# Patient Record
Sex: FEMALE | Race: WHITE | NOT HISPANIC OR LATINO | Employment: UNEMPLOYED | URBAN - METROPOLITAN AREA
[De-identification: names, ages, dates, MRNs, and addresses within clinical notes are randomized per-mention and may not be internally consistent; named-entity substitution may affect disease eponyms.]

---

## 2017-11-30 ENCOUNTER — GENERIC CONVERSION - ENCOUNTER (OUTPATIENT)
Dept: OTHER | Facility: OTHER | Age: 32
End: 2017-11-30

## 2017-12-12 ENCOUNTER — ALLSCRIPTS OFFICE VISIT (OUTPATIENT)
Dept: OTHER | Facility: OTHER | Age: 32
End: 2017-12-12

## 2017-12-13 NOTE — PROGRESS NOTES
Assessment    1  Migraine headache (346 90) (G43 909)   2  BMI 24 0-24 9, adult (V85 1) (Z68 24)   3  Never a smoker    Plan  Migraine headache    · Naproxen 500 MG Oral Tablet; TAKE 1 TABLET EVERY 12 HOURS AS NEEDED   · SUMAtriptan Succinate 50 MG Oral Tablet; TAKE 1 TABLET BY MOUTH AT ONSETOF HEADACHE MAY REPEAT IN 2 HOURS ***NO MORE THAN 2 TABLETS IN 24HOURS    Discussion/Summary    Has failed OTC NSAIDs and Excedrin  Discussed using Sumatriptan at first onset of migraine headache in combination with Naproxen  May repeat dose in 2 hours if headache persists  on signs of blood clots including pain, redness, or swelling in the calf, chest pain, or SOB  headaches unrelieved, would recommended medical eval for other abortive therapy  Possible side effects of new medications were reviewed with the patient/guardian today  The treatment plan was reviewed with the patient/guardian  The patient/guardian understands and agrees with the treatment plan      Chief Complaint  pt c/o persistent headaches around the time of her period  ac/cma      History of Present Illness  HPI: Here today with complaints of headaches that occur at the onset of her menstrual cycle  This has been going on since the birth of her youngest child  The headache is always unilateral, but in frontal region  Pain can be severe at times  Typically lasts for 24-48 hours  She has associated dizziness and nausea  Denies vomiting or aura symptoms  Was treated for vertigo in the ER the first time this happened  Typically takes Excedrin migraine when she gets headaches, which has been ineffective  Has also tried NSAIDs which do not help are regular  On OCPs, however headaches preceded this  no personal or family history of DVT or PE works for the [employer] and she will be joining him in [facility] at this end of the month for a three year stay there         Review of Systems   Constitutional: No fever, no chills, feels well, no tiredness, no recent weight gain or loss   ENT: no earache,-- no sore throat-- and-- no nasal discharge  Cardiovascular: no chest pain,-- no palpitations-- and-- no lower extremity edema  Respiratory: no shortness of breath-- and-- no cough  Neurological: headache-- and-- dizziness, but-- as noted in HPI  Active Problems  1  Dysfunctional uterine bleeding (626 8) (N93 8)   2  Dysmenorrhea (625 3) (N94 6)   3  Vaginal polyp (623 7) (N84 2)    Past Medical History  1  History of Acute maxillary sinusitis (461 0) (J01 00)   2  History of Acute tonsillitis (463) (J03 90)   3  History of Acute tonsillitis (463) (J03 90)  Active Problems And Past Medical History Reviewed: The active problems and past medical history were reviewed and updated today  Social History     · Never a smoker  The social history was reviewed and is unchanged  Current Meds   1  Junel FE 1/20 1-20 MG-MCG Oral Tablet; TAKE 1 TABLET DAILY AS DIRECTED; Therapy: (Recorded:[id number]) to Recorded    The medication list was reviewed and updated today  Allergies  1  No Known Drug Allergies    Vitals   Recorded: [id number] 02:35PM   Temperature 96 2 F   Heart Rate 82   Respiration 16   Systolic 014   Diastolic 70   Height 5 ft 3 5 in   Weight 142 lb    BMI Calculated 24 76   BSA Calculated 1 68   LMP Approx [id number]       Physical Exam   Constitutional  General appearance: No acute distress, well appearing and well nourished  Eyes  Conjunctiva and lids: No swelling, erythema or discharge  Pupils and irises: Equal, round and reactive to light  Pupils: no nystagmus  Cornea, Lens, and Sclera:  Ears, Nose, Mouth, and Throat  Otoscopic examination: Tympanic membranes translucent with normal light reflex  Canals patent without erythema  Nasal mucosa, septum, and turbinates: Normal without edema or erythema  Oropharynx: Normal with no erythema, edema, exudate or lesions  Pulmonary  Respiratory effort: No increased work of breathing or signs of respiratory distress  Auscultation of lungs: Clear to auscultation  Cardiovascular  Auscultation of heart: Normal rate and rhythm, normal S1 and S2, without murmurs  Examination of extremities for edema and/or varicosities: Normal    Lymphatic  Palpation of lymph nodes in neck: No lymphadenopathy  Skin  Skin and subcutaneous tissue: Normal without rashes or lesions  Psychiatric  Mood and affect: Normal    Additional Exam:  normal finger to nose and heel to shin tests  Attending Note  Collaborating Physician Note: Collaborating Note: I agree with the Advanced Practitioner note        Signatures   Electronically signed by : Esha Sequeira; Dec 12 2017  3:32PM EST                       (Author)    Electronically signed by : Warner Vergara DO; Dec 12 2017  9:47PM EST                       (Author)

## 2018-01-22 VITALS
SYSTOLIC BLOOD PRESSURE: 116 MMHG | HEIGHT: 64 IN | HEART RATE: 82 BPM | BODY MASS INDEX: 24.24 KG/M2 | TEMPERATURE: 96.2 F | WEIGHT: 142 LBS | DIASTOLIC BLOOD PRESSURE: 70 MMHG | RESPIRATION RATE: 16 BRPM

## 2018-01-22 VITALS
HEART RATE: 80 BPM | HEIGHT: 64 IN | WEIGHT: 145 LBS | DIASTOLIC BLOOD PRESSURE: 80 MMHG | RESPIRATION RATE: 20 BRPM | SYSTOLIC BLOOD PRESSURE: 110 MMHG | TEMPERATURE: 97.4 F | BODY MASS INDEX: 24.75 KG/M2

## 2018-11-28 ENCOUNTER — OFFICE VISIT (OUTPATIENT)
Dept: FAMILY MEDICINE CLINIC | Facility: CLINIC | Age: 33
End: 2018-11-28
Payer: COMMERCIAL

## 2018-11-28 VITALS
WEIGHT: 158 LBS | SYSTOLIC BLOOD PRESSURE: 106 MMHG | HEART RATE: 74 BPM | HEIGHT: 64 IN | BODY MASS INDEX: 26.98 KG/M2 | TEMPERATURE: 97.6 F | RESPIRATION RATE: 18 BRPM | DIASTOLIC BLOOD PRESSURE: 70 MMHG

## 2018-11-28 DIAGNOSIS — G43.809 OTHER MIGRAINE WITHOUT STATUS MIGRAINOSUS, NOT INTRACTABLE: Primary | ICD-10-CM

## 2018-11-28 PROBLEM — G43.909 MIGRAINE HEADACHE: Status: ACTIVE | Noted: 2017-12-12

## 2018-11-28 PROCEDURE — 1036F TOBACCO NON-USER: CPT | Performed by: INTERNAL MEDICINE

## 2018-11-28 PROCEDURE — 99213 OFFICE O/P EST LOW 20 MIN: CPT | Performed by: INTERNAL MEDICINE

## 2018-11-28 PROCEDURE — 3008F BODY MASS INDEX DOCD: CPT | Performed by: INTERNAL MEDICINE

## 2018-11-28 RX ORDER — NORETHINDRONE ACETATE AND ETHINYL ESTRADIOL 1MG-20(21)
1 KIT ORAL DAILY
COMMUNITY
End: 2019-07-23

## 2018-11-28 RX ORDER — NIFEDIPINE 30 MG/1
30 TABLET, EXTENDED RELEASE ORAL DAILY
Qty: 30 TABLET | Refills: 3 | Status: SHIPPED | OUTPATIENT
Start: 2018-11-28 | End: 2019-07-23

## 2018-11-28 RX ORDER — SUMATRIPTAN 50 MG/1
100 TABLET, FILM COATED ORAL ONCE AS NEEDED
Qty: 9 TABLET | Refills: 3 | Status: SHIPPED | OUTPATIENT
Start: 2018-11-28

## 2018-11-28 RX ORDER — MULTIVIT-MIN/IRON FUM/FOLIC AC 7.5 MG-4
1 TABLET ORAL DAILY
COMMUNITY

## 2018-11-28 RX ORDER — SUMATRIPTAN 50 MG/1
TABLET, FILM COATED ORAL
COMMUNITY
Start: 2017-12-12 | End: 2018-11-28 | Stop reason: SDUPTHER

## 2018-11-28 RX ORDER — FERROUS SULFATE TAB EC 324 MG (65 MG FE EQUIVALENT) 324 (65 FE) MG
TABLET DELAYED RESPONSE ORAL DAILY
COMMUNITY
End: 2019-07-23

## 2018-11-28 NOTE — ASSESSMENT & PLAN NOTE
Reviewed available resources about sumatriptan with breastfeeding  There may be some drug passed on to the infant and per sources the recommendation is to pump and discard for 8 hours after a dose  Also discussed nifedipine for prophylaxis, which would be safe with breastfeeding  She is not sure which option she would like to try, so she was given an rx for both and was advised to follow up here in one month if she chooses to take nifedipine for prophylaxis  Advised to stop medication and call if she gets any lightheadedness or dizziness with the nifedipine  Again, advised to discard milk for 8 hours if she uses sumatriptan for acute treatment

## 2018-11-28 NOTE — PROGRESS NOTES
Subjective:      Patient ID: Huston Ahumada is a 26 y o  female  Chief Complaint   Patient presents with    Headache     pt is breast feeding and wishes to discuss safety of her baby  yunior       Has a history of migraine headaches which have seemed in the past to be menstrual, would get one per month  Would occasionally get vertigo with these  Imitrex helped  Now has had 3 children in the past 5 years and is breastfeeding her youngest   Has had 2 migraines over past 2 weeks, no change other than change in frequency  Is afraid to take anything due to breast feeding and is asking what she can take  Advil or tylenol does not help  No new neurologic symptoms  The following portions of the patient's history were reviewed and updated as appropriate: allergies, current medications, past family history, past medical history, past social history, past surgical history and problem list     Review of Systems   Constitutional: Negative  Neurological: Positive for dizziness and headaches  Current Outpatient Prescriptions   Medication Sig Dispense Refill    Prenatal Vit-DSS-Fe Cbn-FA (PRENA-CAP PO) Take 1 tablet by mouth daily   ferrous sulfate 324 (65 Fe) mg Take by mouth daily      Multiple Vitamins-Minerals (MULTIVITAMIN WITH MINERALS) tablet Take 1 tablet by mouth daily      NIFEdipine (PROCARDIA XL) 30 mg 24 hr tablet Take 1 tablet (30 mg total) by mouth daily 30 tablet 3    norethindrone-ethinyl estradiol (JUNEL FE 1/20) 1-20 MG-MCG per tablet Take 1 tablet by mouth daily      SUMAtriptan (IMITREX) 50 mg tablet Take 2 tablets (100 mg total) by mouth once as needed for migraine for up to 1 dose 9 tablet 3     No current facility-administered medications for this visit  Objective:    /70   Pulse 74   Temp 97 6 °F (36 4 °C)   Resp 18   Ht 5' 4" (1 626 m)   Wt 71 7 kg (158 lb)   Breastfeeding?  Yes   BMI 27 12 kg/m²        Physical Exam   Constitutional: She is oriented to person, place, and time  She appears well-developed and well-nourished  HENT:   Head: Normocephalic and atraumatic  Right Ear: External ear normal    Left Ear: External ear normal    Nose: Nose normal    Mouth/Throat: Oropharynx is clear and moist    Eyes: Conjunctivae are normal    Neck: Neck supple  No JVD present  No thyromegaly present  Cardiovascular: Normal rate, regular rhythm, normal heart sounds and intact distal pulses  Exam reveals no gallop and no friction rub  No murmur heard  Pulmonary/Chest: Effort normal and breath sounds normal  She has no wheezes  She has no rales  Musculoskeletal: She exhibits no edema  Neurological: She is alert and oriented to person, place, and time  She has normal reflexes  She displays normal reflexes  No cranial nerve deficit  She exhibits normal muscle tone  Coordination normal    Psychiatric: She has a normal mood and affect  Assessment/Plan:    Migraine headache  Reviewed available resources about sumatriptan with breastfeeding  There may be some drug passed on to the infant and per sources the recommendation is to pump and discard for 8 hours after a dose  Also discussed nifedipine for prophylaxis, which would be safe with breastfeeding  She is not sure which option she would like to try, so she was given an rx for both and was advised to follow up here in one month if she chooses to take nifedipine for prophylaxis  Advised to stop medication and call if she gets any lightheadedness or dizziness with the nifedipine  Again, advised to discard milk for 8 hours if she uses sumatriptan for acute treatment  Diagnoses and all orders for this visit:    Other migraine without status migrainosus, not intractable  -     SUMAtriptan (IMITREX) 50 mg tablet; Take 2 tablets (100 mg total) by mouth once as needed for migraine for up to 1 dose  -     NIFEdipine (PROCARDIA XL) 30 mg 24 hr tablet;  Take 1 tablet (30 mg total) by mouth daily    Other orders  -     norethindrone-ethinyl estradiol (JUNEL FE 1/20) 1-20 MG-MCG per tablet; Take 1 tablet by mouth daily  -     Discontinue: SUMAtriptan (IMITREX) 50 mg tablet; Take by mouth  -     ferrous sulfate 324 (65 Fe) mg; Take by mouth daily  -     Multiple Vitamins-Minerals (MULTIVITAMIN WITH MINERALS) tablet; Take 1 tablet by mouth daily          Return in about 4 weeks (around 12/26/2018)         Zofia Mendenhall MD

## 2018-12-10 ENCOUNTER — OFFICE VISIT (OUTPATIENT)
Dept: FAMILY MEDICINE CLINIC | Facility: CLINIC | Age: 33
End: 2018-12-10
Payer: COMMERCIAL

## 2018-12-10 VITALS
TEMPERATURE: 97.7 F | HEIGHT: 64 IN | RESPIRATION RATE: 16 BRPM | WEIGHT: 155.2 LBS | DIASTOLIC BLOOD PRESSURE: 88 MMHG | BODY MASS INDEX: 26.5 KG/M2 | HEART RATE: 84 BPM | SYSTOLIC BLOOD PRESSURE: 112 MMHG

## 2018-12-10 DIAGNOSIS — J06.9 ACUTE URI: Primary | ICD-10-CM

## 2018-12-10 PROCEDURE — 3008F BODY MASS INDEX DOCD: CPT | Performed by: NURSE PRACTITIONER

## 2018-12-10 PROCEDURE — 1036F TOBACCO NON-USER: CPT | Performed by: NURSE PRACTITIONER

## 2018-12-10 PROCEDURE — 99213 OFFICE O/P EST LOW 20 MIN: CPT | Performed by: NURSE PRACTITIONER

## 2018-12-10 NOTE — PATIENT INSTRUCTIONS
Gargle with warm salt water for 5 minutes every 4 hours  Drink plenty of fluids at least 6 glasses of water a day  Can use some honey lemon tea  Call or follow up if symptoms are not better in 7 days  Supportive care discussed and advised  Follow up for no improvement and worsening of conditions  Patient advised and educated when to see immediate medical care  Call for any high fever if persists more than 48 hours

## 2018-12-10 NOTE — PROGRESS NOTES
Subjective:      Patient ID: Jessica Santillan is a 26 y o  female  Chief Complaint   Patient presents with   Manpreet Candace nose     patient states started on saturday 12/8/18    Laryngitis    Sore Throat       HPI  Patient started with sore throat, hoarseness of voice and running nose from couple of days  Denies fever, chills, sob  Currently not taking any OTC medications  Currently breast feeding  The following portions of the patient's history were reviewed and updated as appropriate: allergies, current medications, past family history, past medical Call for istory, past social history, past surgical history and problem list       Review of Systems   Constitutional: Negative for chills, fatigue and fever  HENT: Positive for sore throat and voice change  Negative for congestion, ear discharge, ear pain, facial swelling, hearing loss, mouth sores, nosebleeds, postnasal drip, rhinorrhea, sinus pain, sinus pressure, sneezing and trouble swallowing  Respiratory: Negative for cough, chest tightness, shortness of breath and wheezing  Cardiovascular: Negative  Gastrointestinal: Negative for abdominal pain, constipation, diarrhea and nausea  Neurological: Negative for dizziness, weakness, light-headedness and headaches           Objective:    History   Smoking Status    Never Smoker   Smokeless Tobacco    Never Used       Allergies: No Known Allergies    Vitals:  /88   Pulse 84   Temp 97 7 °F (36 5 °C)   Resp 16   Ht 5' 4" (1 626 m)   Wt 70 4 kg (155 lb 3 2 oz)   BMI 26 64 kg/m²     Current Outpatient Prescriptions   Medication Sig Dispense Refill    ferrous sulfate 324 (65 Fe) mg Take by mouth daily      Prenatal Vit-Iron Carbonyl-FA (PRENATAL MULTIVITAMIN) TABS Take 1 tablet by mouth daily      SUMAtriptan (IMITREX) 50 mg tablet Take 2 tablets (100 mg total) by mouth once as needed for migraine for up to 1 dose 9 tablet 3    Multiple Vitamins-Minerals (MULTIVITAMIN WITH MINERALS) tablet Take 1 tablet by mouth daily      NIFEdipine (PROCARDIA XL) 30 mg 24 hr tablet Take 1 tablet (30 mg total) by mouth daily (Patient not taking: Reported on 12/10/2018 ) 30 tablet 3    norethindrone-ethinyl estradiol (JUNEL FE 1/20) 1-20 MG-MCG per tablet Take 1 tablet by mouth daily      Prenatal Vit-DSS-Fe Cbn-FA (PRENA-CAP PO) Take 1 tablet by mouth daily  No current facility-administered medications for this visit  Physical Exam   Constitutional: She is oriented to person, place, and time  She appears well-developed and well-nourished  HENT:   Head: Normocephalic  Right Ear: Tympanic membrane, external ear and ear canal normal    Left Ear: Tympanic membrane, external ear and ear canal normal    Nose: Nose normal  Right sinus exhibits no maxillary sinus tenderness and no frontal sinus tenderness  Left sinus exhibits no maxillary sinus tenderness and no frontal sinus tenderness  Mouth/Throat: Oropharynx is clear and moist and mucous membranes are normal    Neck: Neck supple  Cardiovascular: Normal rate, regular rhythm and normal heart sounds  Pulmonary/Chest: Effort normal and breath sounds normal    Abdominal: Normal appearance  Musculoskeletal: Normal range of motion  Lymphadenopathy:        Right cervical: No superficial cervical and no posterior cervical adenopathy present  Left cervical: No superficial cervical and no posterior cervical adenopathy present  Neurological: She is alert and oriented to person, place, and time  Skin: Skin is warm and dry  Psychiatric: She has a normal mood and affect  Her behavior is normal  Judgment and thought content normal    Vitals reviewed  Assessment/Plan:    Supportive care discussed and advise and will follow up for any worsening       Diagnoses and all orders for this visit:    Acute URI    BMI 26 0-26 9,adult    Other orders  -     Prenatal Vit-Iron Carbonyl-FA (PRENATAL MULTIVITAMIN) TABS; Take 1 tablet by mouth daily            Patient Instructions:  Gargle with warm salt water for 5 minutes every 4 hours  Drink plenty of fluids at least 6 glasses of water a day  Can use some honey lemon tea  Call or follow up if symptoms are not better in 7 days  Supportive care discussed and advised  Follow up for no improvement and worsening of conditions  Patient advised and educated when to see immediate medical care  Call for any high fever if persists more than 48 hours  Return if symptoms worsen or fail to improve        RONALD Milner

## 2019-07-23 ENCOUNTER — OFFICE VISIT (OUTPATIENT)
Dept: FAMILY MEDICINE CLINIC | Facility: CLINIC | Age: 34
End: 2019-07-23
Payer: COMMERCIAL

## 2019-07-23 VITALS
SYSTOLIC BLOOD PRESSURE: 110 MMHG | RESPIRATION RATE: 16 BRPM | HEIGHT: 64 IN | DIASTOLIC BLOOD PRESSURE: 60 MMHG | WEIGHT: 135 LBS | HEART RATE: 76 BPM | BODY MASS INDEX: 23.05 KG/M2 | TEMPERATURE: 98 F

## 2019-07-23 DIAGNOSIS — A06.9 AMEBIASIS: Primary | ICD-10-CM

## 2019-07-23 PROCEDURE — 99213 OFFICE O/P EST LOW 20 MIN: CPT | Performed by: NURSE PRACTITIONER

## 2019-07-23 PROCEDURE — 1036F TOBACCO NON-USER: CPT | Performed by: NURSE PRACTITIONER

## 2019-07-23 PROCEDURE — 3008F BODY MASS INDEX DOCD: CPT | Performed by: NURSE PRACTITIONER

## 2019-07-23 NOTE — PROGRESS NOTES
Assessment/Plan:    ID referral provided  She will RTO as needed or for CPE  Problem List Items Addressed This Visit     None      Visit Diagnoses     Amebiasis    -  Primary    Relevant Orders    Ambulatory referral to Infectious Disease            Patient Instructions   Dr Rambo Briscoe in [facility]  Dr Kai Hernandez at [facility]  Return if symptoms worsen or fail to improve  Subjective:      Patient ID: Justin Elizabeth is a 37 y o  female  Chief Complaint   Patient presents with    discuss infectious disease dr Sridhar Alvarez       Pt has been living in [facility] for the past 2 years while her  has been stationed there  She was have gastrointestinal issues while there and tested positive for an entomeaba entomoeba infection in June  She and her family were treated and then retested after 2 weeks and was again, positive  She is eligible to retest at the end of the month  She is requesting a referral for ID for further evaluation ad treatment  She still has some stomach cramping and occasional diarrhea, but symptoms are significantly improved  The following portions of the patient's history were reviewed and updated as appropriate: allergies, current medications, past family history, past medical history, past social history, past surgical history and problem list     Review of Systems   Constitutional: Negative  Gastrointestinal:        See HPI   All other systems reviewed and are negative  Current Outpatient Medications   Medication Sig Dispense Refill    Multiple Vitamins-Minerals (MULTIVITAMIN WITH MINERALS) tablet Take 1 tablet by mouth daily      Prenatal Vit-DSS-Fe Cbn-FA (PRENA-CAP PO) Take 1 tablet by mouth daily   SUMAtriptan (IMITREX) 50 mg tablet Take 2 tablets (100 mg total) by mouth once as needed for migraine for up to 1 dose 9 tablet 3     No current facility-administered medications for this visit          Objective:    /60   Pulse 76   Temp 98 °F (36 7 °C)   Resp 16   Ht 5' 4" (1 626 m)   Wt 61 2 kg (135 lb)   BMI 23 17 kg/m²        Physical Exam   Constitutional: She appears well-developed and well-nourished  HENT:   Head: Normocephalic and atraumatic  Right Ear: Tympanic membrane, external ear and ear canal normal    Left Ear: Tympanic membrane, external ear and ear canal normal    Nose: No mucosal edema or rhinorrhea  Mouth/Throat: Uvula is midline, oropharynx is clear and moist and mucous membranes are normal    Eyes: Conjunctivae are normal    Neck: Neck supple  No edema present  No thyromegaly present  Cardiovascular: Normal rate, regular rhythm, normal heart sounds and intact distal pulses  No murmur heard  Pulmonary/Chest: Effort normal and breath sounds normal    Abdominal: Bowel sounds are normal  She exhibits no distension  There is no splenomegaly or hepatomegaly  There is no tenderness  Lymphadenopathy:        Right cervical: No superficial cervical adenopathy present  Left cervical: No superficial cervical adenopathy present  Skin: Skin is warm, dry and intact  No rash noted  Psychiatric: She has a normal mood and affect  Nursing note and vitals reviewed               Devon Knight

## 2019-07-24 ENCOUNTER — TELEPHONE (OUTPATIENT)
Dept: FAMILY MEDICINE CLINIC | Facility: CLINIC | Age: 34
End: 2019-07-24

## 2019-07-24 NOTE — TELEPHONE ENCOUNTER
Kapil Ma is calling here stating that Dr Chuckie Maier office LVN will not make her an apt without speaking with Jonah Galicia does not know why they need to speak with our office, maybe we can call and see or make the apt for her? Please call patient back after we speak with ID office      Thank you

## 2020-02-03 ENCOUNTER — OFFICE VISIT (OUTPATIENT)
Dept: FAMILY MEDICINE CLINIC | Facility: CLINIC | Age: 35
End: 2020-02-03
Payer: COMMERCIAL

## 2020-02-03 VITALS
SYSTOLIC BLOOD PRESSURE: 114 MMHG | DIASTOLIC BLOOD PRESSURE: 70 MMHG | HEIGHT: 64 IN | HEART RATE: 72 BPM | RESPIRATION RATE: 16 BRPM | BODY MASS INDEX: 25.95 KG/M2 | TEMPERATURE: 98.5 F | WEIGHT: 152 LBS

## 2020-02-03 DIAGNOSIS — M79.2 NEURITIS: Primary | ICD-10-CM

## 2020-02-03 PROCEDURE — 3008F BODY MASS INDEX DOCD: CPT | Performed by: FAMILY MEDICINE

## 2020-02-03 PROCEDURE — 99213 OFFICE O/P EST LOW 20 MIN: CPT | Performed by: FAMILY MEDICINE

## 2020-02-03 PROCEDURE — 1036F TOBACCO NON-USER: CPT | Performed by: FAMILY MEDICINE

## 2020-02-03 NOTE — PROGRESS NOTES
Assessment/Plan:    1  Neuritis  -     EMG 1 Limb; Future        unsure what the pt Vanessa Moore when he was delivering  I will get an emg and see if any nerve damage is present        There are no Patient Instructions on file for this visit  No follow-ups on file  Subjective:      Patient ID: Carley Lazcano is a 37 y o  female  Chief Complaint   Patient presents with    Pain     left wrist-lj       Pt is here for a same appt  Pt states she had a c section a little ocer a year ago, states at the time they did the IV and she had discomfort at that time  Pt states when they put the medication in it it would hurt  Pt states in her post partum appt pt states she had pain in that area was advised to ice and give it time  Pt states she has time where she has mini flare ups of munbness tiongling pain and inflammation  Pt states it is horribly uncomfortable  States it hurts today she has numbness in the outer part of her arm  Pt states she has not mentioned this to her PCP  This happened over a year ago  The following portions of the patient's history were reviewed and updated as appropriate: allergies, current medications, past family history, past medical history, past social history, past surgical history and problem list     Review of Systems   Constitutional: Negative  Negative for activity change, appetite change, chills, diaphoresis and fatigue  HENT: Negative  Negative for dental problem, ear pain, sinus pressure and sore throat  Eyes: Negative  Negative for photophobia, pain, discharge, redness, itching and visual disturbance  Respiratory: Negative for apnea and chest tightness  Cardiovascular: Negative  Negative for chest pain, palpitations and leg swelling  Gastrointestinal: Negative  Negative for abdominal distention, abdominal pain, constipation and diarrhea  Endocrine: Negative  Negative for cold intolerance and heat intolerance     Genitourinary: Negative  Negative for difficulty urinating and dyspareunia  Musculoskeletal: Negative  Negative for arthralgias and back pain  Skin: Negative  Allergic/Immunologic: Negative for environmental allergies  Neurological: Positive for numbness  Negative for dizziness  Psychiatric/Behavioral: Negative  Negative for agitation  Current Outpatient Medications   Medication Sig Dispense Refill    Multiple Vitamins-Minerals (MULTIVITAMIN WITH MINERALS) tablet Take 1 tablet by mouth daily      Prenatal Vit-DSS-Fe Cbn-FA (PRENA-CAP PO) Take 1 tablet by mouth daily   SUMAtriptan (IMITREX) 50 mg tablet Take 2 tablets (100 mg total) by mouth once as needed for migraine for up to 1 dose (Patient not taking: Reported on 2/3/2020) 9 tablet 3     No current facility-administered medications for this visit  Objective:    /70   Pulse 72   Temp 98 5 °F (36 9 °C)   Resp 16   Ht 5' 4" (1 626 m)   Wt 68 9 kg (152 lb)   BMI 26 09 kg/m²        Physical Exam   Constitutional: She appears well-developed and well-nourished  No distress  HENT:   Head: Normocephalic and atraumatic  Right Ear: External ear normal    Left Ear: External ear normal    Nose: Nose normal    Mouth/Throat: Oropharynx is clear and moist  No oropharyngeal exudate  Eyes: Pupils are equal, round, and reactive to light  EOM are normal  Right eye exhibits no discharge  Left eye exhibits no discharge  No scleral icterus  Neck: No thyromegaly present  Cardiovascular: Normal rate and normal heart sounds  No murmur heard  Pulmonary/Chest: Effort normal and breath sounds normal  No respiratory distress  She has no wheezes  Abdominal: Soft  Bowel sounds are normal  She exhibits no distension and no mass  There is no tenderness  There is no rebound and no guarding  Musculoskeletal: Normal range of motion  Neurological: She is alert  She displays normal reflexes  Coordination normal    Skin: Skin is warm and dry   No rash noted  She is not diaphoretic  No erythema  Psychiatric: She has a normal mood and affect  Her behavior is normal    Nursing note and vitals reviewed               Marc King DO

## 2022-01-21 ENCOUNTER — TELEPHONE (OUTPATIENT)
Dept: FAMILY MEDICINE CLINIC | Facility: CLINIC | Age: 37
End: 2022-01-21

## 2022-04-29 NOTE — TELEPHONE ENCOUNTER
04/29/22 11:31 AM     Thank you for your request  Your request has been received, reviewed, and the patient chart updated  The PCP has successfully been removed with a patient attribution note  This message will now be completed      Thank you  Dago Washington